# Patient Record
Sex: MALE | Race: BLACK OR AFRICAN AMERICAN | Employment: OTHER | URBAN - METROPOLITAN AREA
[De-identification: names, ages, dates, MRNs, and addresses within clinical notes are randomized per-mention and may not be internally consistent; named-entity substitution may affect disease eponyms.]

---

## 2018-10-08 NOTE — PATIENT DISCUSSION
New Prescription: Lotemax (loteprednol etabonate): gel: 0.5% 1 drop four times a day into both eyes 10-

## 2020-03-06 NOTE — PATIENT DISCUSSION
POSTERIOR VITREOUS DETACHMENT, OU: NO RETINAL HOLES OR TEARS ON SLIT LAMP EXAM. RD PRECAUTIONS DISCUSSED. CALLBACK INSTRUCTIONS GIVEN. RETURN FOR FOLLOW-UP AS SCHEDULED.

## 2020-03-06 NOTE — PATIENT DISCUSSION
[facility] OU: PRESCRIBED PRESERVATIVE FREE ARTIFICIAL TEARS 4-6X A DAY, OU AND THE DAILY INTAKE OF OMEGA-3 DHA/EPA FATTY ACIDS TO HELP RELIEVE SYMPTOMS. ADD NIGHTLY LUBRICATING OINTMENT OR GEL. RESTART RESTASIS BID OU. PRIOR AUTH DONE TODAY. CONSIDER PUNCTAL PLUGS AND/OR LIPIFLOW TREATMENT NEXT VISIT IF NOT RESPONSIVE OR IF SYMPTOMS PERSIST. RETURN FOR FOLLOW-UP AS SCHEDULED OR SOONER IF SYMPTOMS WORSEN.

## 2022-09-23 ENCOUNTER — EMERGENCY VISIT (OUTPATIENT)
Dept: URBAN - METROPOLITAN AREA CLINIC 27 | Facility: CLINIC | Age: 77
End: 2022-09-23

## 2022-09-23 DIAGNOSIS — H40.023: ICD-10-CM

## 2022-09-23 PROCEDURE — 99214 OFFICE O/P EST MOD 30 MIN: CPT

## 2022-09-23 PROCEDURE — 92083 EXTENDED VISUAL FIELD XM: CPT

## 2022-09-23 PROCEDURE — 92250 FUNDUS PHOTOGRAPHY W/I&R: CPT

## 2022-09-23 ASSESSMENT — KERATOMETRY
OS_K2POWER_DIOPTERS: 42.50
OD_K1POWER_DIOPTERS: 41.50
OD_AXISANGLE2_DEGREES: 68
OS_K1POWER_DIOPTERS: 41.25
OS_AXISANGLE2_DEGREES: 124
OD_K2POWER_DIOPTERS: 42.00
OS_AXISANGLE_DEGREES: 034
OD_AXISANGLE_DEGREES: 158

## 2022-09-23 ASSESSMENT — VISUAL ACUITY
OS_PH: 20/20
OS_SC: J2
OD_CC: 20/25
OS_SC: 20/200

## 2022-09-23 ASSESSMENT — TONOMETRY
OD_IOP_MMHG: 22
OS_IOP_MMHG: 25
OS_IOP_MMHG: 27
OS_IOP_MMHG: 23
OD_IOP_MMHG: 21

## 2024-03-22 ENCOUNTER — LAB (OUTPATIENT)
Dept: URBAN - METROPOLITAN AREA CLINIC 63 | Facility: CLINIC | Age: 79
End: 2024-03-22

## 2024-03-22 ENCOUNTER — OV NP (OUTPATIENT)
Dept: URBAN - METROPOLITAN AREA CLINIC 63 | Facility: CLINIC | Age: 79
End: 2024-03-22
Payer: COMMERCIAL

## 2024-03-22 VITALS
OXYGEN SATURATION: 98 % | HEART RATE: 68 BPM | HEIGHT: 71 IN | DIASTOLIC BLOOD PRESSURE: 78 MMHG | BODY MASS INDEX: 30.1 KG/M2 | WEIGHT: 215 LBS | TEMPERATURE: 97.2 F | SYSTOLIC BLOOD PRESSURE: 120 MMHG

## 2024-03-22 DIAGNOSIS — Z95.818 PRESENCE OF WATCHMAN LEFT ATRIAL APPENDAGE CLOSURE DEVICE: ICD-10-CM

## 2024-03-22 DIAGNOSIS — R13.12 OROPHARYNGEAL DYSPHAGIA: ICD-10-CM

## 2024-03-22 PROBLEM — 71457002: Status: ACTIVE | Noted: 2024-03-22

## 2024-03-22 PROCEDURE — 99203 OFFICE O/P NEW LOW 30 MIN: CPT

## 2024-03-22 RX ORDER — PANTOPRAZOLE SODIUM 40 MG/1
1 TABLET TABLET, DELAYED RELEASE ORAL ONCE A DAY
Status: ACTIVE | COMMUNITY

## 2024-03-22 RX ORDER — LEVOTHYROXINE SODIUM 75 UG/1
1 TABLET IN THE MORNING ON AN EMPTY STOMACH TABLET ORAL ONCE A DAY
Status: ACTIVE | COMMUNITY

## 2024-03-22 RX ORDER — EVOLOCUMAB 140 MG/ML
1 ML INJECTION, SOLUTION SUBCUTANEOUS
Status: ACTIVE | COMMUNITY

## 2024-03-22 NOTE — PHYSICAL EXAM GASTROINTESTINAL
Abdomen , soft, nontender, nondistended , no guarding or rigidity , no masses palpable , normal bowel sounds , Liver and Spleen,  no hepatosplenomegaly , liver nontender Repeat /88, pt. is asymptomatic.  Pt. to have medical clearance as requested by the Surgeon.  Will have It faxed to PST.

## 2024-03-22 NOTE — HPI-PREVIOUS LABS
3/13/2021 CMP significant for glucose of 109, creatinine of 1.65, EGFR 42, lipid panel within normal limits, CBC significant for neutrophils of 7.8 but otherwise within normal limits.

## 2024-03-22 NOTE — HPI-ZZZTODAY'S VISIT
Rip is a very pleasant 78-year-old male who presents dysphagia.  He is new patient to this practice and will be establishing with Dr. Eddy today.  Past medical history significant for persistent atrial fibrillation with history of CABG and chronic ischemic heart disease, CKD status post left nephrectomy secondary to cancer, hypothyroidism, GERD, hypertension, hyperlipidemia.  Past surgical history significant for Watchman (09/23/2024), CABG, ablation, orthopedic procedures, Nephrectomy 2015.  Last colonoscopy 2014.  Last endoscopy with dilation 2022. Family history noncontributory. Tommy presents for chief complaint of difficulty swallowing.  He relates a difficulty initiating swallow.  He experiences this with all things including food most liquids and pills.  He does not cause coughing but does feel like it gets stuck when he swallows.  He has had this before and needed a dilation.  He denies need for Heimlich maneuver.  For this reason his primary care provider put him on pantoprazole 40 mg a week ago.

## 2024-03-23 PROBLEM — 1259226009: Status: ACTIVE | Noted: 2024-03-23

## 2024-03-29 ENCOUNTER — LAB (OUTPATIENT)
Dept: URBAN - METROPOLITAN AREA CLINIC 63 | Facility: CLINIC | Age: 79
End: 2024-03-29

## 2024-05-10 ENCOUNTER — OFFICE VISIT (OUTPATIENT)
Dept: URBAN - METROPOLITAN AREA MEDICAL CENTER 14 | Facility: MEDICAL CENTER | Age: 79
End: 2024-05-10

## 2024-05-10 RX ORDER — PANTOPRAZOLE SODIUM 40 MG/1
1 TABLET TABLET, DELAYED RELEASE ORAL ONCE A DAY
Status: ACTIVE | COMMUNITY

## 2024-05-10 RX ORDER — LEVOTHYROXINE SODIUM 75 UG/1
1 TABLET IN THE MORNING ON AN EMPTY STOMACH TABLET ORAL ONCE A DAY
Status: ACTIVE | COMMUNITY

## 2024-05-10 RX ORDER — EVOLOCUMAB 140 MG/ML
1 ML INJECTION, SOLUTION SUBCUTANEOUS
Status: ACTIVE | COMMUNITY

## 2025-01-21 ENCOUNTER — DASHBOARD ENCOUNTERS (OUTPATIENT)
Age: 80
End: 2025-01-21

## 2025-01-21 ENCOUNTER — OFFICE VISIT (OUTPATIENT)
Dept: URBAN - METROPOLITAN AREA CLINIC 63 | Facility: CLINIC | Age: 80
End: 2025-01-21
Payer: COMMERCIAL

## 2025-01-21 VITALS
HEART RATE: 67 BPM | OXYGEN SATURATION: 98 % | DIASTOLIC BLOOD PRESSURE: 72 MMHG | SYSTOLIC BLOOD PRESSURE: 112 MMHG | TEMPERATURE: 97.1 F | HEIGHT: 71 IN | BODY MASS INDEX: 30.21 KG/M2 | WEIGHT: 215.8 LBS

## 2025-01-21 DIAGNOSIS — R19.6 BAD BREATH: ICD-10-CM

## 2025-01-21 DIAGNOSIS — R09.82 POST-NASAL DRIP: ICD-10-CM

## 2025-01-21 DIAGNOSIS — R13.12 OROPHARYNGEAL DYSPHAGIA: ICD-10-CM

## 2025-01-21 PROBLEM — 79879001: Status: ACTIVE | Noted: 2025-01-21

## 2025-01-21 PROCEDURE — 99214 OFFICE O/P EST MOD 30 MIN: CPT

## 2025-01-21 RX ORDER — EVOLOCUMAB 140 MG/ML
1 ML INJECTION, SOLUTION SUBCUTANEOUS
Status: ACTIVE | COMMUNITY

## 2025-01-21 RX ORDER — OMEPRAZOLE 40 MG/1
1 CAPSULE 30 MINUTES BEFORE MORNING MEAL CAPSULE, DELAYED RELEASE ORAL ONCE A DAY
Qty: 90 | Refills: 3 | OUTPATIENT
Start: 2025-01-21

## 2025-01-21 RX ORDER — OMEPRAZOLE 20 MG/1
1 CAPSULE 1/2 TO 1 HOUR BEFORE MORNING MEAL CAPSULE, DELAYED RELEASE ORAL ONCE A DAY
Status: ACTIVE | COMMUNITY

## 2025-01-21 RX ORDER — LEVOTHYROXINE SODIUM 75 UG/1
1 TABLET IN THE MORNING ON AN EMPTY STOMACH TABLET ORAL ONCE A DAY
Status: ACTIVE | COMMUNITY

## 2025-01-21 NOTE — HPI-ZZZTODAY'S VISIT
Patient is a very pleasant 79-year-old male who presents for follow-up.  He is a patient of Dr. Eddy.  I last saw him 3/22/2024.  Past medical history significant for persistent atrial fibrillation with history of CABG and chronic ischemic heart disease, CKD status post left nephrectomy secondary to cancer, hypothyroidism, GERD, hypertension, hyperlipidemia.  Past surgical history significant for watchman placement September 23, 2024, CABG, ablation, orthopedic procedures, nephrectomy 2015.  Last colonoscopy 2014.  Last endoscopy with dilation 5/10/2024.  Family history noncontributory. Previously patient presented for difficulty swallowing.  He was having difficulty initiating swallow with both food and liquid as well as pills.  He is on pantoprazole 40 mg daily. Swallow studies wer previously ordered but not performed.  Patient was referred back to our practice for GERD. He relates several weeks ago he developed severe bad breath for which he saw his PCP. The bad breath is all the time.  His last dentist appointment was about 6 years ago.  He has no dental discomfort or pain.  He feels that the odor is coming from his throat and into his mouth.  He has not changed his diet or lifestyle.  He has not been on antibiotics recently.  He had associated epigastric discomfort.  He has no sick contacts.  He admits to postnasal drip but does not tolerate allergy medication because it is very drying.  He has never followed with an ENT previously.  He denies dysphagia since endoscopy dilation.  He denies unintentional weight loss, change in bowel habits, melena, hematochezia.  He was previously on pantoprazole 40 mg for acid reflux.  He discontinued the medication 1 week ago because his heart rate was 120 and he did not feel right.  Since discontinuing his pantoprazole he has not had an increase in heart rate.

## 2025-01-21 NOTE — HPI-PREVIOUS PROCEDURES
5/10/2024 endoscopy with dilation consistent with normal duodenum Antral striped erosive gastritis Mild to moderate congestive gastropathy 2 cm hiatal hernia Endoscopic short segment Solomon's esophagus Nonobstructing Schatzki's ring dilated with 54 Lithuanian Beckwith Gastric antrum consistent with reactive gastropathy and mild chronic gastritis negative for H. pylori GE junction with gastroesophageal mucosa with chronic active gastritis negative for intestinal metaplasia dysplasia or malignancy.

## 2025-01-21 NOTE — HPI-PREVIOUS IMAGING
2/2/2024 CT abdomen pelvis without contrast consistent with diverticulosis Normal appendix and gallbladder Left nephrectomy, lobulated benign-appearing right lower pole renal cyst

## 2025-01-22 ENCOUNTER — TELEPHONE ENCOUNTER (OUTPATIENT)
Dept: URBAN - METROPOLITAN AREA CLINIC 63 | Facility: CLINIC | Age: 80
End: 2025-01-22

## 2025-01-24 LAB — H PYLORI BREATH TEST: NEGATIVE

## 2025-01-29 ENCOUNTER — OFFICE VISIT (OUTPATIENT)
Dept: URBAN - METROPOLITAN AREA CLINIC 63 | Facility: CLINIC | Age: 80
End: 2025-01-29

## 2025-03-03 ENCOUNTER — OFFICE VISIT (OUTPATIENT)
Dept: URBAN - METROPOLITAN AREA CLINIC 63 | Facility: CLINIC | Age: 80
End: 2025-03-03
Payer: COMMERCIAL

## 2025-03-03 ENCOUNTER — OFFICE VISIT (OUTPATIENT)
Dept: URBAN - METROPOLITAN AREA CLINIC 63 | Facility: CLINIC | Age: 80
End: 2025-03-03

## 2025-03-03 VITALS
HEIGHT: 71 IN | SYSTOLIC BLOOD PRESSURE: 118 MMHG | TEMPERATURE: 97.1 F | DIASTOLIC BLOOD PRESSURE: 78 MMHG | OXYGEN SATURATION: 98 % | BODY MASS INDEX: 30.38 KG/M2 | HEART RATE: 57 BPM | WEIGHT: 217 LBS

## 2025-03-03 DIAGNOSIS — R05.3 CHRONIC COUGH: ICD-10-CM

## 2025-03-03 DIAGNOSIS — R09.82 POST-NASAL DRIP: ICD-10-CM

## 2025-03-03 DIAGNOSIS — K21.9 GERD WITHOUT ESOPHAGITIS: ICD-10-CM

## 2025-03-03 DIAGNOSIS — R19.6 BAD BREATH: ICD-10-CM

## 2025-03-03 PROBLEM — 266435005: Status: ACTIVE | Noted: 2025-03-03

## 2025-03-03 PROCEDURE — 99214 OFFICE O/P EST MOD 30 MIN: CPT

## 2025-03-03 RX ORDER — EVOLOCUMAB 140 MG/ML
1 ML INJECTION, SOLUTION SUBCUTANEOUS
Status: ACTIVE | COMMUNITY

## 2025-03-03 RX ORDER — OMEPRAZOLE 40 MG/1
1 CAPSULE 30 MINUTES BEFORE MORNING MEAL CAPSULE, DELAYED RELEASE ORAL ONCE A DAY
Qty: 90 | Refills: 3 | Status: ACTIVE | COMMUNITY
Start: 2025-01-21

## 2025-03-03 RX ORDER — FAMOTIDINE 40 MG/1
1 TABLET AT BEDTIME TABLET, FILM COATED ORAL ONCE A DAY
Qty: 90 TABLET | Refills: 3 | OUTPATIENT
Start: 2025-03-03

## 2025-03-03 RX ORDER — LEVOTHYROXINE SODIUM 75 UG/1
1 TABLET IN THE MORNING ON AN EMPTY STOMACH TABLET ORAL ONCE A DAY
Status: ACTIVE | COMMUNITY

## 2025-03-03 RX ORDER — OMEPRAZOLE 40 MG/1
1 CAPSULE 30 MINUTES BEFORE MORNING MEAL CAPSULE, DELAYED RELEASE ORAL ONCE A DAY
Qty: 90 | Refills: 3 | OUTPATIENT

## 2025-03-03 NOTE — HPI-ZZZTODAY'S VISIT
Patient is a very pleasant 79-year-old male who presents for follow-up.  He is a patient of Dr. Eddy.  I last saw him 1/25/2024.  Past medical history significant for persistent atrial fibrillation with history of CABG and chronic ischemic heart disease, CKD status post left nephrectomy secondary to cancer, hypothyroidism, GERD, hypertension, hyperlipidemia.  Past surgical history significant for watchman placement September 2024, CABG, ablation, orthopedic procedures, nephrectomy 2015.  Last colonoscopy 2014.  Last endoscopy 5/10/2024.  Family history noncontributory. Patient has been followed for difficulty initiating swallow with solids liquids and pills.  He is on pantoprazole 40 mg daily.  EGD with chronic gastritis negative for H. pylori.  Resolution of dysphagia with dilation.  He subsequently developed bad breath and epigastric discomfort.  Previously admitted to postnasal drip but does not tolerate allergy medicine because of its drying effect. He was previously on pantoprazole 40 mg for acid reflux.  He discontinued the medication because his heart rate did not feel right. Patient presents for follow-up.  He relates that he had improvement in his stomach with induction of omeprazole 40 mg.  He continues to get feelings of postnasal drip and swallowing mucus in the back of his throat with a chronic cough.  He did see the ENT who told him he had nothing severely wrong with him and started him on a nasal spray.  He followed up with his PCP in the interim and the PCP started him on an inhaler but he has not started this inhaler yet to see a difference.  He denies dysphagia, dyspepsia, unintentional weight loss, change in bowel habits, melena, hematochezia.

## 2025-03-03 NOTE — HPI-PREVIOUS PROCEDURES
5/10/2024 endoscopy with dilation consistent with normal duodenum Antral striped erosive gastritis Mild to moderate congestive gastropathy 2 cm hiatal hernia Endoscopic short segment Solomon's esophagus Nonobstructing Schatzki's ring dilated with 54 Turkish Beckwith Gastric antrum consistent with reactive gastropathy and mild chronic gastritis negative for H. pylori GE junction with gastroesophageal mucosa with chronic active gastritis negative for intestinal metaplasia dysplasia or malignancy.

## 2025-05-05 ENCOUNTER — OFFICE VISIT (OUTPATIENT)
Dept: URBAN - METROPOLITAN AREA CLINIC 63 | Facility: CLINIC | Age: 80
End: 2025-05-05

## 2025-05-05 RX ORDER — LEVOTHYROXINE SODIUM 75 UG/1
1 TABLET IN THE MORNING ON AN EMPTY STOMACH TABLET ORAL ONCE A DAY
Status: ACTIVE | COMMUNITY

## 2025-05-05 RX ORDER — FAMOTIDINE 40 MG/1
1 TABLET AT BEDTIME TABLET, FILM COATED ORAL ONCE A DAY
Qty: 90 TABLET | Refills: 3 | Status: ACTIVE | COMMUNITY
Start: 2025-03-03

## 2025-05-05 RX ORDER — OMEPRAZOLE 20 MG/1
1 CAPSULE 1/2 TO 1 HOUR BEFORE MORNING MEAL CAPSULE, DELAYED RELEASE ORAL ONCE A DAY
Status: ACTIVE | COMMUNITY

## 2025-05-05 RX ORDER — EVOLOCUMAB 140 MG/ML
1 ML INJECTION, SOLUTION SUBCUTANEOUS
Status: ACTIVE | COMMUNITY

## 2025-05-05 RX ORDER — OMEPRAZOLE 40 MG/1
1 CAPSULE 30 MINUTES BEFORE MORNING MEAL CAPSULE, DELAYED RELEASE ORAL ONCE A DAY
Qty: 90 | Refills: 3 | Status: ACTIVE | COMMUNITY

## 2025-05-05 NOTE — HPI-ZZZTODAY'S VISIT
Patient is a very pleasant 88-year-old male who presents for follow-up.  He is a patient of Dr. Eddy.  I last saw him 3/3/2025.  Past medical history significant for atrial fibrillation, history of CABG and chronic ischemic heart disease, CKD status post left nephrectomy secondary to cancer, hypothyroidism, GERD, hypertension, hyperlipidemia.  Past surgical history significant for watchman placement September 2024, CABG, ablation, orthopedic procedures, nephrectomy 2015.  Last colonoscopy 2014.  Last endoscopy 5/10/2024.  Family history noncontributory. Previously patient is followed for difficulty swallowing and initiating of swallow especially liquids and pills.  EGD with chronic gastritis negative for H. pylori.  He had resolution of dysphagia with dilation.  He subsequently developed halitosis and epigastric discomfort.  He had admitted to postnasal drip but did not tolerate allergy medicine due to its drying effect.  For this reason he was referred to ENT, however when seen by ENT they told him nothing was severely wrong and to start him on a nasal spray.  He in the interim saw his PCP who started him on an inhaler instead.  He had not started the inhaler at her last office visit to see any improvement.  He also switched from pantoprazole to omeprazole 40 mg with limited efficacy.  For this reason I also inducted famotidine 40 mg in the evenings.